# Patient Record
Sex: MALE | Race: WHITE | NOT HISPANIC OR LATINO | Employment: FULL TIME | URBAN - METROPOLITAN AREA
[De-identification: names, ages, dates, MRNs, and addresses within clinical notes are randomized per-mention and may not be internally consistent; named-entity substitution may affect disease eponyms.]

---

## 2017-12-08 ENCOUNTER — OFFICE VISIT (OUTPATIENT)
Dept: INTERNAL MEDICINE | Facility: MEDICAL CENTER | Age: 27
End: 2017-12-08
Payer: MEDICAID

## 2017-12-08 VITALS
HEART RATE: 115 BPM | HEIGHT: 70 IN | OXYGEN SATURATION: 96 % | RESPIRATION RATE: 12 BRPM | TEMPERATURE: 99.3 F | DIASTOLIC BLOOD PRESSURE: 98 MMHG | WEIGHT: 195.2 LBS | SYSTOLIC BLOOD PRESSURE: 158 MMHG | BODY MASS INDEX: 27.94 KG/M2

## 2017-12-08 DIAGNOSIS — I10 HYPERTENSION, UNSPECIFIED TYPE: ICD-10-CM

## 2017-12-08 DIAGNOSIS — Z00.00 HEALTHCARE MAINTENANCE: ICD-10-CM

## 2017-12-08 DIAGNOSIS — Z13.220 SCREENING FOR LIPOID DISORDERS: ICD-10-CM

## 2017-12-08 PROCEDURE — 99203 OFFICE O/P NEW LOW 30 MIN: CPT | Mod: GC | Performed by: HOSPITALIST

## 2017-12-08 ASSESSMENT — PATIENT HEALTH QUESTIONNAIRE - PHQ9: CLINICAL INTERPRETATION OF PHQ2 SCORE: 0

## 2017-12-08 NOTE — PATIENT INSTRUCTIONS

## 2017-12-09 NOTE — PROGRESS NOTES
"New Patient to Establish    Reason to establish: New patient to establish    CC: establish care and follow up on high blood pressure readings    HPI: 27 y/o M with negative PMH. Presents to the clinic to establish care and to follow up on high blood pressure readings. The patient reports having high blood pressure readings for many years. The patient used to weigh 236 pounds previously but now his weight is 195 pounds. He lost weight by doing regular exercises and eat healthy. Even with loosing weight he still complaining of high blood pressure readings.  He denies headache, blurred vision, chest pain, abdominal pain, episodic sweating or palpitations, temor, heat/cold intolerance, or taking any hormones for body build.    Patient Active Problem List    Diagnosis Date Noted   • Hypertension 12/08/2017   • Screening for lipoid disorders 12/08/2017   • Healthcare maintenance 12/08/2017       History reviewed. No pertinent past medical history.    No current outpatient prescriptions on file.     No current facility-administered medications for this visit.        Allergies as of 12/08/2017   • (Not on File)       Social History     Social History   • Marital status: Single     Spouse name: N/A   • Number of children: N/A   • Years of education: N/A     Occupational History   • Not on file.     Social History Main Topics   • Smoking status: Never Smoker   • Smokeless tobacco: Never Used   • Alcohol use Yes   • Drug use: No   • Sexual activity: Not on file     Other Topics Concern   • Not on file     Social History Narrative   • No narrative on file       Family History   Problem Relation Age of Onset   • Hypertension Father        History reviewed. No pertinent surgical history.    ROS: As per HPI. Additional pertinent symptoms as noted below.    All others negative    /98   Pulse (!) 115   Temp 37.4 °C (99.3 °F)   Resp 12   Ht 1.778 m (5' 10\")   Wt 88.5 kg (195 lb 3.2 oz)   SpO2 96%   BMI 28.01 kg/m² "     Physical Exam  General:  Alert and oriented, No apparent distress.    Eyes: Pupils equal and reactive. No scleral icterus.    Throat: Clear no erythema or exudates noted.    Neck: Supple. No lymphadenopathy noted. Thyroid not enlarged.    Lungs: Clear to auscultation and percussion bilaterally.    Cardiovascular: Regular rate and rhythm. No murmurs, rubs or gallops.    Abdomen:  Benign. No rebound or guarding noted. No bruits    Extremities: No clubbing, cyanosis, edema. Or tremor    Skin: Clear. No rash or suspicious skin lesions noted.    Other:     Note: I have reviewed all pertinent labs and diagnostic tests associated with this visit with specific comments listed under the assessment and plan below    Assessment and Plan    1. Hypertension, unspecified type  Essential vs secondary (fibromuscular dysplasia, Conn's syndrome, or cushing)  - Positive Hx of obesity but he lost weight during the last 2 years now his BMI is 28  - Positive family Hx of DM and HTN (father)  - Physical examination wnl  Plan  - CBC, CMP, TSH, T4, Lipin Panel  - Doppler abdominal U/S    2. Screening for lipoid disorders  - Pt has no recent lipid panel on files for the last 5 years.  - FHx of dyslipidemia, HTN, and DM  - Hx of frequent high blood pressure readings.  Plan  - Lipid Panel    3. Healthcare maintenance  - Up to date on Flu vaccine  - Up to date on Tdap vaccine      Followup: Return in about 10 days (around 12/18/2017).    Risk Assessment (discuss potential complications a function of chronic problems): Risk assessment has been discussed with the patient    Complexity (discuss number of co-morbidities): Co- morbidities have been discussed with the patient.    Signed by: Zunilda Bolivar M.D.

## 2017-12-15 ENCOUNTER — HOSPITAL ENCOUNTER (OUTPATIENT)
Dept: LAB | Facility: MEDICAL CENTER | Age: 27
End: 2017-12-15
Attending: STUDENT IN AN ORGANIZED HEALTH CARE EDUCATION/TRAINING PROGRAM
Payer: MEDICAID

## 2017-12-15 ENCOUNTER — HOSPITAL ENCOUNTER (OUTPATIENT)
Dept: RADIOLOGY | Facility: MEDICAL CENTER | Age: 27
End: 2017-12-15
Attending: STUDENT IN AN ORGANIZED HEALTH CARE EDUCATION/TRAINING PROGRAM
Payer: MEDICAID

## 2017-12-15 DIAGNOSIS — I10 HYPERTENSION, UNSPECIFIED TYPE: ICD-10-CM

## 2017-12-15 LAB
ALBUMIN SERPL BCP-MCNC: 4.6 G/DL (ref 3.2–4.9)
ALBUMIN/GLOB SERPL: 1.6 G/DL
ALP SERPL-CCNC: 46 U/L (ref 30–99)
ALT SERPL-CCNC: 16 U/L (ref 2–50)
ANION GAP SERPL CALC-SCNC: 6 MMOL/L (ref 0–11.9)
AST SERPL-CCNC: 14 U/L (ref 12–45)
BASOPHILS # BLD AUTO: 0.7 % (ref 0–1.8)
BASOPHILS # BLD: 0.03 K/UL (ref 0–0.12)
BILIRUB SERPL-MCNC: 1.2 MG/DL (ref 0.1–1.5)
BUN SERPL-MCNC: 13 MG/DL (ref 8–22)
CALCIUM SERPL-MCNC: 9.9 MG/DL (ref 8.5–10.5)
CHLORIDE SERPL-SCNC: 105 MMOL/L (ref 96–112)
CO2 SERPL-SCNC: 25 MMOL/L (ref 20–33)
CREAT SERPL-MCNC: 0.81 MG/DL (ref 0.5–1.4)
EOSINOPHIL # BLD AUTO: 0.03 K/UL (ref 0–0.51)
EOSINOPHIL NFR BLD: 0.7 % (ref 0–6.9)
ERYTHROCYTE [DISTWIDTH] IN BLOOD BY AUTOMATED COUNT: 41.8 FL (ref 35.9–50)
GFR SERPL CREATININE-BSD FRML MDRD: >60 ML/MIN/1.73 M 2
GLOBULIN SER CALC-MCNC: 2.8 G/DL (ref 1.9–3.5)
GLUCOSE SERPL-MCNC: 104 MG/DL (ref 65–99)
HCT VFR BLD AUTO: 47.5 % (ref 42–52)
HGB BLD-MCNC: 16.4 G/DL (ref 14–18)
IMM GRANULOCYTES # BLD AUTO: 0.01 K/UL (ref 0–0.11)
IMM GRANULOCYTES NFR BLD AUTO: 0.2 % (ref 0–0.9)
LYMPHOCYTES # BLD AUTO: 1.82 K/UL (ref 1–4.8)
LYMPHOCYTES NFR BLD: 43 % (ref 22–41)
MCH RBC QN AUTO: 32.3 PG (ref 27–33)
MCHC RBC AUTO-ENTMCNC: 34.5 G/DL (ref 33.7–35.3)
MCV RBC AUTO: 93.5 FL (ref 81.4–97.8)
MONOCYTES # BLD AUTO: 0.36 K/UL (ref 0–0.85)
MONOCYTES NFR BLD AUTO: 8.5 % (ref 0–13.4)
NEUTROPHILS # BLD AUTO: 1.98 K/UL (ref 1.82–7.42)
NEUTROPHILS NFR BLD: 46.9 % (ref 44–72)
NRBC # BLD AUTO: 0 K/UL
NRBC BLD AUTO-RTO: 0 /100 WBC
PLATELET # BLD AUTO: 155 K/UL (ref 164–446)
PMV BLD AUTO: 11.3 FL (ref 9–12.9)
POTASSIUM SERPL-SCNC: 3.9 MMOL/L (ref 3.6–5.5)
PROT SERPL-MCNC: 7.4 G/DL (ref 6–8.2)
RBC # BLD AUTO: 5.08 M/UL (ref 4.7–6.1)
SODIUM SERPL-SCNC: 136 MMOL/L (ref 135–145)
TSH SERPL DL<=0.005 MIU/L-ACNC: 1.9 UIU/ML (ref 0.38–5.33)
WBC # BLD AUTO: 4.2 K/UL (ref 4.8–10.8)

## 2017-12-15 PROCEDURE — 85025 COMPLETE CBC W/AUTO DIFF WBC: CPT

## 2017-12-15 PROCEDURE — 76700 US EXAM ABDOM COMPLETE: CPT

## 2017-12-15 PROCEDURE — 80053 COMPREHEN METABOLIC PANEL: CPT

## 2017-12-15 PROCEDURE — 84443 ASSAY THYROID STIM HORMONE: CPT

## 2017-12-15 PROCEDURE — 93975 VASCULAR STUDY: CPT

## 2017-12-15 PROCEDURE — 36415 COLL VENOUS BLD VENIPUNCTURE: CPT

## 2017-12-20 ENCOUNTER — OFFICE VISIT (OUTPATIENT)
Dept: INTERNAL MEDICINE | Facility: MEDICAL CENTER | Age: 27
End: 2017-12-20
Payer: MEDICAID

## 2017-12-20 VITALS
DIASTOLIC BLOOD PRESSURE: 80 MMHG | SYSTOLIC BLOOD PRESSURE: 124 MMHG | OXYGEN SATURATION: 96 % | TEMPERATURE: 99.1 F | RESPIRATION RATE: 20 BRPM | BODY MASS INDEX: 28.06 KG/M2 | HEIGHT: 70 IN | WEIGHT: 196 LBS | HEART RATE: 94 BPM

## 2017-12-20 DIAGNOSIS — Z00.00 HEALTHCARE MAINTENANCE: ICD-10-CM

## 2017-12-20 DIAGNOSIS — Z09 FOLLOW UP: ICD-10-CM

## 2017-12-20 DIAGNOSIS — D69.6 THROMBOCYTOPENIA (HCC): ICD-10-CM

## 2017-12-20 DIAGNOSIS — R03.0 BORDERLINE HIGH BLOOD PRESSURE: ICD-10-CM

## 2017-12-20 PROCEDURE — 99214 OFFICE O/P EST MOD 30 MIN: CPT | Mod: GC | Performed by: INTERNAL MEDICINE

## 2017-12-20 ASSESSMENT — PAIN SCALES - GENERAL: PAINLEVEL: NO PAIN

## 2017-12-20 NOTE — PROGRESS NOTES
"      Established Patient    Bryan presents today with the following:    CC: Follow up on lab results and blood pressure readings    HPI: 27 y/o M with pertinent negative PMH. Presents to the clinic to follow up on lab results and blood pressure readings. The patient reports having high blood pressure readings for many years. The patient used to weigh 236 pounds previously but now his weight is 195 pounds. He lost weight by doing regular exercises and eat healthy. Even with loosing weight he still complaining of high blood pressure readings.  The office blood pressure today is 124/80 mmHg, The patient reports that he did not do any morning physical exercise today.    He denies headache, blurred vision, chest pain, abdominal pain, episodic sweating or palpitations, temor, heat/cold intolerance, or taking any hormones for body build.    Patient Active Problem List    Diagnosis Date Noted   • Hypertension 12/08/2017   • Screening for lipoid disorders 12/08/2017   • Healthcare maintenance 12/08/2017       No current outpatient prescriptions on file.     No current facility-administered medications for this visit.        ROS: As per HPI. Additional pertinent symptoms as noted below.    All others negative    /80   Pulse 94   Temp 37.3 °C (99.1 °F)   Resp 20   Ht 1.778 m (5' 10\")   Wt 88.9 kg (196 lb)   SpO2 96%   BMI 28.12 kg/m²     Physical Exam   Constitutional:  oriented to person, place, and time. No distress.   Eyes: Pupils are equal, round, and reactive to light. No scleral icterus.  Neck: Neck supple. No thyromegaly present.   Cardiovascular: Normal rate, regular rhythm and normal heart sounds.  Exam reveals no gallop and no friction rub.  No murmur heard.  Pulmonary/Chest: Breath sounds normal. Chest wall is not dull to percussion.   Musculoskeletal:   no edema.   Lymphadenopathy: no cervical adenopathy  Neurological: alert and oriented to person, place, and time.   Skin: No cyanosis. Nails show no " clubbing.  Other:     Note: I have reviewed all pertinent labs and diagnostic tests associated with this visit with specific comments listed under the assessment and plan below    Assessment and Plan      1. Borderline high blood pressure  - Probably because of essential hypertension (positive FHx of HTN) vs anxiety and stress about the application for new job  - The blood pressure readings on last office visit was 158/98 mmHg, today blood pressure reading is 124/80 mmHg  - The patient denies headache, blurred vision, or chest pain. Denies using anapolic steroids  - Renal Dopplex ultrasound done on 12/15/2017 was wnl  - Electrolytes level are wnl (K and Na)  Plan  Advice the patient to bring blood pressure diary with him on next office visit    2. Thrombocytopenia  Stable, denies Hx of petechia, or prolong bleeding from cuts   The CBP showed a platelet level of 155.  Plan  - Repeat CBC in 5 weeks before his next appointment.  - Test for HIV      3. Healthcare maintenance  Flu vaccine (up to date, got it in 2017)   Tdap (up to date, got it in 2017)    Followup: Return if symptoms worsen or fail to improve.      Signed by: Zunilda Bolivar M.D.

## 2017-12-20 NOTE — PATIENT INSTRUCTIONS
Please do not forget to bring the blood pressure diary with you on your next office visit    Stress and Stress Management  Stress is a normal reaction to life events. It is what you feel when life demands more than you are used to or more than you can handle. Some stress can be useful. For example, the stress reaction can help you catch the last bus of the day, study for a test, or meet a deadline at work. But stress that occurs too often or for too long can cause problems. It can affect your emotional health and interfere with relationships and normal daily activities. Too much stress can weaken your immune system and increase your risk for physical illness. If you already have a medical problem, stress can make it worse.  CAUSES   All sorts of life events may cause stress. An event that causes stress for one person may not be stressful for another person. Major life events commonly cause stress. These may be positive or negative. Examples include losing your job, moving into a new home, getting , having a baby, or losing a loved one. Less obvious life events may also cause stress, especially if they occur day after day or in combination. Examples include working long hours, driving in traffic, caring for children, being in debt, or being in a difficult relationship.  SIGNS AND SYMPTOMS  Stress may cause emotional symptoms including, the following:  · Anxiety. This is feeling worried, afraid, on edge, overwhelmed, or out of control.  · Anger. This is feeling irritated or impatient.  · Depression. This is feeling sad, down, helpless, or guilty.  · Difficulty focusing, remembering, or making decisions.  Stress may cause physical symptoms, including the following:   · Aches and pains. These may affect your head, neck, back, stomach, or other areas of your body.  · Tight muscles or clenched jaw.  · Low energy or trouble sleeping.   Stress may cause unhealthy behaviors, including the following:   · Eating to feel  "better (overeating) or skipping meals.  · Sleeping too little, too much, or both.  · Working too much or putting off tasks (procrastination).  · Smoking, drinking alcohol, or using drugs to feel better.  DIAGNOSIS   Stress is diagnosed through an assessment by your health care provider. Your health care provider will ask questions about your symptoms and any stressful life events. Your health care provider will also ask about your medical history and may order blood tests or other tests. Certain medical conditions and medicine can cause physical symptoms similar to stress.  Mental illness can cause emotional symptoms and unhealthy behaviors similar to stress. Your health care provider may refer you to a mental health professional for further evaluation.   TREATMENT   Stress management is the recommended treatment for stress. The goals of stress management are reducing stressful life events and coping with stress in healthy ways.   Techniques for reducing stressful life events include the following:  · Stress identification. Self-monitor for stress and identify what causes stress for you. These skills may help you to avoid some stressful events.  · Time management. Set your priorities, keep a calendar of events, and learn to say \"no.\" These tools can help you avoid making too many commitments.  Techniques for coping with stress include the following:  · Rethinking the problem. Try to think realistically about stressful events rather than ignoring them or overreacting. Try to find the positives in a stressful situation rather than focusing on the negatives.  · Exercise. Physical exercise can release both physical and emotional tension. The key is to find a form of exercise you enjoy and do it regularly.  · Relaxation techniques. These relax the body and mind. Examples include yoga, meditation, clifton chi, biofeedback, deep breathing, progressive muscle relaxation, listening to music, being out in nature, journaling, and " other hobbies. Again, the key is to find one or more that you enjoy and can do regularly.  · Healthy lifestyle. Eat a balanced diet, get plenty of sleep, and do not smoke. Avoid using alcohol or drugs to relax.  · Strong support network. Spend time with family, friends, or other people you enjoy being around. Express your feelings and talk things over with someone you trust.  Counseling or talk therapy with a mental health professional may be helpful if you are having difficulty managing stress on your own. Medicine is typically not recommended for the treatment of stress. Talk to your health care provider if you think you need medicine for symptoms of stress.  HOME CARE INSTRUCTIONS  · Keep all follow-up visits as directed by your health care provider.  · Take all medicines as directed by your health care provider.  SEEK MEDICAL CARE IF:  · Your symptoms get worse or you start having new symptoms.  · You feel overwhelmed by your problems and can no longer manage them on your own.  SEEK IMMEDIATE MEDICAL CARE IF:  · You feel like hurting yourself or someone else.     This information is not intended to replace advice given to you by your health care provider. Make sure you discuss any questions you have with your health care provider.     Document Released: 06/13/2002 Document Revised: 01/08/2016 Document Reviewed: 08/12/2014  [employer] Patient Education ©2016 [employer]

## 2017-12-21 ENCOUNTER — HOSPITAL ENCOUNTER (OUTPATIENT)
Dept: LAB | Facility: MEDICAL CENTER | Age: 27
End: 2017-12-21
Attending: STUDENT IN AN ORGANIZED HEALTH CARE EDUCATION/TRAINING PROGRAM
Payer: MEDICAID

## 2017-12-21 DIAGNOSIS — D69.6 THROMBOCYTOPENIA (HCC): ICD-10-CM

## 2017-12-21 DIAGNOSIS — R03.0 BORDERLINE HIGH BLOOD PRESSURE: ICD-10-CM

## 2017-12-21 LAB
CREAT UR-MCNC: 66 MG/DL
HIV 1+2 AB+HIV1 P24 AG SERPL QL IA: NON REACTIVE
MICROALBUMIN UR-MCNC: <0.7 MG/DL
MICROALBUMIN/CREAT UR: NORMAL MG/G (ref 0–30)

## 2017-12-21 PROCEDURE — 36415 COLL VENOUS BLD VENIPUNCTURE: CPT

## 2017-12-21 PROCEDURE — 82043 UR ALBUMIN QUANTITATIVE: CPT

## 2017-12-21 PROCEDURE — 87389 HIV-1 AG W/HIV-1&-2 AB AG IA: CPT

## 2017-12-21 PROCEDURE — 82570 ASSAY OF URINE CREATININE: CPT

## 2023-03-07 ENCOUNTER — APPOINTMENT (OUTPATIENT)
Dept: RADIOLOGY | Facility: IMAGING CENTER | Age: 33
End: 2023-03-07
Attending: NURSE PRACTITIONER
Payer: COMMERCIAL

## 2023-03-07 ENCOUNTER — OFFICE VISIT (OUTPATIENT)
Dept: URGENT CARE | Facility: CLINIC | Age: 33
End: 2023-03-07
Payer: COMMERCIAL

## 2023-03-07 VITALS
HEART RATE: 108 BPM | BODY MASS INDEX: 35.22 KG/M2 | SYSTOLIC BLOOD PRESSURE: 128 MMHG | RESPIRATION RATE: 16 BRPM | TEMPERATURE: 98.6 F | HEIGHT: 70 IN | OXYGEN SATURATION: 94 % | WEIGHT: 246 LBS | DIASTOLIC BLOOD PRESSURE: 76 MMHG

## 2023-03-07 DIAGNOSIS — S69.91XA INJURY OF FINGER OF RIGHT HAND, INITIAL ENCOUNTER: ICD-10-CM

## 2023-03-07 DIAGNOSIS — M62.838 SPASM OF CERVICAL PARASPINOUS MUSCLE: ICD-10-CM

## 2023-03-07 DIAGNOSIS — S09.90XA INJURY OF HEAD, INITIAL ENCOUNTER: ICD-10-CM
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
## 2023-03-07 DIAGNOSIS — S62.654A CLOSED NONDISPLACED FRACTURE OF MIDDLE PHALANX OF RIGHT RING FINGER, INITIAL ENCOUNTER: ICD-10-CM

## 2023-03-07 PROCEDURE — 73140 X-RAY EXAM OF FINGER(S): CPT | Mod: TC,FY,RT

## 2023-03-07 PROCEDURE — 99203 OFFICE O/P NEW LOW 30 MIN: CPT | Performed by: NURSE PRACTITIONER

## 2023-03-07 RX ORDER — BACLOFEN 10 MG/1
10 TABLET ORAL 2 TIMES DAILY PRN
Qty: 21 TABLET | Refills: 0 | Status: SHIPPED | OUTPATIENT
Start: 2023-03-07 | End: 2023-03-14

## 2023-03-07 ASSESSMENT — FIBROSIS 4 INDEX: FIB4 SCORE: 0.7

## 2023-03-07 NOTE — PROGRESS NOTES
Patient has consented to treatment and for use of patient information for treatment and billing purposes.    Date: 03/07/23     Arrival Mode: Private Vehicle    Chief Complaint:    Chief Complaint   Patient presents with    Fall     Pt states he fell on black ice, states he did not lose consciousness, states back is sore, right arm is sore, right ring finger is swollen, laceration on right pinky finger, bruising on left palm         History of Present Illness: 32 y.o.  male presents to clinic status post fall after slipping on ice.  Patient states he did hit the back of his head.  He denies loss of consciousness, retrograde amnesia photosensitivity nausea vomiting headache vision changes or dizziness.  He has not noted any swelling to the back of his head.  He denies any rhinorrhea.  Patient states he did catch himself with his left hand although he somehow injured his right ring finger as well.  He is unsure of the mechanism.  He has a small laceration on his left pinky.  He denies any shortness of breath chest pain or leg swelling.  Again he denies nausea vomiting or diarrhea.        ROS:    As stated in HPI     Pertinent Medical History:  Past Medical History:   Diagnosis Date    Borderline high blood pressure         Pertinent Surgical History:  History reviewed. No pertinent surgical history.     Pertinent Medications:    No current outpatient medications on file prior to visit.     No current facility-administered medications on file prior to visit.        Allergies:    Patient has no allergy information on record.     Social History:  Social History     Tobacco Use    Smoking status: Never    Smokeless tobacco: Never   Vaping Use    Vaping Use: Never used   Substance Use Topics    Alcohol use: Not Currently    Drug use: No        No LMP for male patient.           Physical Exam:    Vitals:    03/07/23 0858   BP: (!) 144/88   Pulse: (!) 108   Resp: 16   Temp: 37 °C (98.6 °F)   SpO2: 94%             Physical  Exam  Constitutional:       General: He is awake.      Appearance: Normal appearance. He is not ill-appearing, toxic-appearing or diaphoretic.   HENT:      Head: Normocephalic and atraumatic. No raccoon eyes or Gurrola's sign.      Comments: No bony instability, hematoma or tenderness to the back of the patient's head.     Right Ear: Tympanic membrane, ear canal and external ear normal. No hemotympanum.      Left Ear: Tympanic membrane, ear canal and external ear normal. No hemotympanum.      Nose: Nose normal. No rhinorrhea.      Mouth/Throat:      Lips: Pink.      Mouth: Mucous membranes are moist.      Pharynx: Oropharynx is clear.   Eyes:      General: Lids are normal. Gaze aligned appropriately. No allergic shiner or scleral icterus.     Extraocular Movements: Extraocular movements intact.      Conjunctiva/sclera: Conjunctivae normal.      Pupils: Pupils are equal, round, and reactive to light.   Cardiovascular:      Rate and Rhythm: Normal rate and regular rhythm.      Pulses:           Radial pulses are 2+ on the right side and 2+ on the left side.      Heart sounds: Normal heart sounds.   Pulmonary:      Effort: Pulmonary effort is normal.      Breath sounds: Normal breath sounds and air entry. No decreased breath sounds, wheezing, rhonchi or rales.   Musculoskeletal:      Right elbow: Normal. Normal range of motion. No tenderness.      Left elbow: Normal. Normal range of motion. No tenderness.      Right forearm: Normal.      Left forearm: Normal.      Right wrist: Normal. No snuff box tenderness. Normal range of motion. Normal pulse.      Left wrist: Normal. No snuff box tenderness. Normal range of motion. Normal pulse.      Right hand: Swelling and tenderness present. No bony tenderness. Normal range of motion. Normal strength. Normal sensation. There is no disruption of two-point discrimination. Normal capillary refill. Normal pulse.      Left hand: Swelling, laceration and bony tenderness present.  Decreased range of motion. Normal sensation. There is no disruption of two-point discrimination. Normal capillary refill. Normal pulse.        Hands:       Cervical back: Spasms and tenderness present. No bony tenderness.      Thoracic back: No bony tenderness.      Lumbar back: No bony tenderness.      Right lower leg: No edema.      Left lower leg: No edema.      Comments: No midline bony tenderness crepitus or step-off noted.  Muscular tenderness to the left trapezius extending to the shoulder and cervical spine paraspinous muscles.   Lymphadenopathy:      Cervical: No cervical adenopathy.   Skin:     General: Skin is warm.      Capillary Refill: Capillary refill takes less than 2 seconds.      Coloration: Skin is not cyanotic or pale.   Neurological:      Mental Status: He is alert and oriented to person, place, and time.      Cranial Nerves: Cranial nerves 2-12 are intact.      Sensory: Sensation is intact.      Motor: Motor function is intact.      Coordination: Coordination is intact.      Gait: Gait is intact.   Psychiatric:         Behavior: Behavior normal. Behavior is cooperative.                Diagnostics:      DX-FINGER(S) 2+ RIGHT    Result Date: 3/7/2023  3/7/2023 9:18 AM HISTORY/REASON FOR EXAM:  Pain following trauma. TECHNIQUE/EXAM DESCRIPTION AND NUMBER OF VIEWS:  3 views of the RIGHT fingers. COMPARISON: None FINDINGS: BONE MINERALIZATION: Normal. JOINTS: Preserved. No erosions. FRACTURE: None. DISLOCATION: None. SOFT TISSUES: No mass.     No acute osseous abnormality.      Diagnostics interpreted by myself.  There is soft tissue swelling between the PIP and MCP of the right fourth finger.  Possible nondisplaced fracture articular surface of the PIP.  Did discuss with patient that radiologist did interpret fracture.  Although out of caution we will treat.      Medical Decision making and clinic course :  I personally reviewed prior external notes and test results pertinent to today's visit. Pt  is clinically stable at today's acute urgent care visit.  No acute distress noted. Appropriate for outpatient care at this time. Shared decision-making was utilized with patient for treatment plan.    Pleasant 32-year-old male presenting clinic status post falling on ice.  Fortunately patient has no red flag findings on exam or HPI.  There is no indication for acute head imaging at this time.  Did discuss return to full activity if he does experience any headaches to pull back on activity.  Right fourth finger is placed in a finger splint by myself and buddy taped.  Recommended ice and rest.  Will refer to Ortho for follow-up if patient has continued pain over the next 2 weeks.  Did discuss Tylenol/ibuprofen and ice for paraspinous muscle pain.  There is no midline bony tenderness crepitus step-off.  Will send for baclofen for muscle relaxer.  Patient states he will likely not fill this medication as he does not respond well with sedating medications such as Norco.  Did discuss this in a nonnarcotic medication.  Although it may make him sleepy advise he if he does decide to take it only take it at night do not take anything else sedating do not drive or operate heavy machinery while on it.      The patient remained stable during the urgent care visit.    Plan:    Medication discussed included indication for use and the potential  benefits and side effects.    1. Injury of finger of right hand, initial encounter    - DX-FINGER(S) 2+ RIGHT    2. Fall due to slipping on ice or snow, initial encounter      3. Injury of head, initial encounter      4. Closed nondisplaced fracture of middle phalanx of right ring finger, initial encounter    - Referral to Orthopedics    5. Spasm of cervical paraspinous muscle    - baclofen (LIORESAL) 10 MG Tab; Take 1 Tablet by mouth 2 times a day as needed (muscle spasm) for up to 7 days.  Dispense: 21 Tablet; Refill: 0       Printed education was provided regarding the aforementioned  assessments.  All of the patient's questions were answered to their satisfaction at the time of discharge.    Follow up:    Recommended f/u in  10 days  if there is no improvement.    Patient was encouraged to monitor symptoms closely. Those signs and symptoms which would warrant concern and mandate seeking a higher level of service through the emergency department discussed at length and included in discharge papers.  Patient stated agreement and understanding of this plan of care.    Disposition:  Home in stable condition       Voice Recognition Disclaimer:  Portions of this document were created using voice recognition software. The software does have a chance of producing errors of grammar and possibly content. I have made every reasonable attempt to correct obvious errors, but there may be errors of grammar and possibly content that I did not discover before finalizing the documentation.    Vivienne Durham, ADOLFO.P.R.N.

## 2023-03-07 NOTE — LETTER
March 7, 2023    To Whom It May Concern:         This is confirmation that Bryan Burt attended his scheduled appointment with [facility] on 3/07/23.  Is excuse from work today due to fall on ice.  Please excuse patient today through 3/8/2023.  He may return to work on 3/8/2023 if his symptoms are improved with no restrictions.  If his symptoms are not improved he may return 3/9/2023 with no restrictions.      Sincerely,          ADRIENNE [facility].  [phone]